# Patient Record
Sex: FEMALE | Race: BLACK OR AFRICAN AMERICAN | NOT HISPANIC OR LATINO | Employment: STUDENT | URBAN - METROPOLITAN AREA
[De-identification: names, ages, dates, MRNs, and addresses within clinical notes are randomized per-mention and may not be internally consistent; named-entity substitution may affect disease eponyms.]

---

## 2020-10-15 ENCOUNTER — OFFICE VISIT (OUTPATIENT)
Dept: URGENT CARE | Facility: CLINIC | Age: 20
End: 2020-10-15
Payer: COMMERCIAL

## 2020-10-15 VITALS
RESPIRATION RATE: 22 BRPM | SYSTOLIC BLOOD PRESSURE: 128 MMHG | OXYGEN SATURATION: 98 % | DIASTOLIC BLOOD PRESSURE: 76 MMHG | HEART RATE: 120 BPM | TEMPERATURE: 102 F | WEIGHT: 134 LBS

## 2020-10-15 DIAGNOSIS — G44.89 OTHER HEADACHE SYNDROME: ICD-10-CM

## 2020-10-15 DIAGNOSIS — R50.9 FEVER, UNSPECIFIED FEVER CAUSE: Primary | ICD-10-CM

## 2020-10-15 DIAGNOSIS — N89.8 VAGINAL DISCHARGE: ICD-10-CM

## 2020-10-15 DIAGNOSIS — N75.0 INFECTED CYST OF BARTHOLIN'S GLAND DUCT: ICD-10-CM

## 2020-10-15 LAB
B-HCG UR QL: NEGATIVE
BILIRUB UR QL STRIP: NEGATIVE
CTP QC/QA: YES
FLUAV AG NPH QL: NEGATIVE
FLUBV AG NPH QL: NEGATIVE
GLUCOSE UR QL STRIP: NEGATIVE
KETONES UR QL STRIP: NEGATIVE
LEUKOCYTE ESTERASE UR QL STRIP: POSITIVE
PH, POC UA: 7 (ref 5–8)
POC BLOOD, URINE: NEGATIVE
POC NITRATES, URINE: NEGATIVE
PROT UR QL STRIP: NEGATIVE
SARS-COV-2 RDRP RESP QL NAA+PROBE: NEGATIVE
SP GR UR STRIP: 1.01 (ref 1–1.03)
UROBILINOGEN UR STRIP-ACNC: ABNORMAL (ref 0.1–1.1)

## 2020-10-15 PROCEDURE — 96372 THER/PROPH/DIAG INJ SC/IM: CPT | Mod: S$GLB,,, | Performed by: NURSE PRACTITIONER

## 2020-10-15 PROCEDURE — 87075 CULTR BACTERIA EXCEPT BLOOD: CPT

## 2020-10-15 PROCEDURE — 81003 POCT URINALYSIS, DIPSTICK, AUTOMATED, W/O SCOPE: ICD-10-PCS | Mod: QW,S$GLB,, | Performed by: NURSE PRACTITIONER

## 2020-10-15 PROCEDURE — U0002: ICD-10-PCS | Mod: QW,S$GLB,, | Performed by: NURSE PRACTITIONER

## 2020-10-15 PROCEDURE — 96372 PR INJECTION,THERAP/PROPH/DIAG2ST, IM OR SUBCUT: ICD-10-PCS | Mod: S$GLB,,, | Performed by: NURSE PRACTITIONER

## 2020-10-15 PROCEDURE — 99204 OFFICE O/P NEW MOD 45 MIN: CPT | Mod: 25,S$GLB,, | Performed by: NURSE PRACTITIONER

## 2020-10-15 PROCEDURE — 87804 POCT INFLUENZA A/B: ICD-10-PCS | Mod: QW,S$GLB,, | Performed by: NURSE PRACTITIONER

## 2020-10-15 PROCEDURE — 81003 URINALYSIS AUTO W/O SCOPE: CPT | Mod: QW,S$GLB,, | Performed by: NURSE PRACTITIONER

## 2020-10-15 PROCEDURE — U0002 COVID-19 LAB TEST NON-CDC: HCPCS | Mod: QW,S$GLB,, | Performed by: NURSE PRACTITIONER

## 2020-10-15 PROCEDURE — 99204 PR OFFICE/OUTPT VISIT, NEW, LEVL IV, 45-59 MIN: ICD-10-PCS | Mod: 25,S$GLB,, | Performed by: NURSE PRACTITIONER

## 2020-10-15 PROCEDURE — 87804 INFLUENZA ASSAY W/OPTIC: CPT | Mod: QW,S$GLB,, | Performed by: NURSE PRACTITIONER

## 2020-10-15 PROCEDURE — 87070 CULTURE OTHR SPECIMN AEROBIC: CPT

## 2020-10-15 RX ORDER — CEFTRIAXONE 1 G/1
1 INJECTION, POWDER, FOR SOLUTION INTRAMUSCULAR; INTRAVENOUS
Status: COMPLETED | OUTPATIENT
Start: 2020-10-15 | End: 2020-10-15

## 2020-10-15 RX ORDER — SULFAMETHOXAZOLE AND TRIMETHOPRIM 800; 160 MG/1; MG/1
1 TABLET ORAL 2 TIMES DAILY
Qty: 14 TABLET | Refills: 0 | Status: SHIPPED | OUTPATIENT
Start: 2020-10-15 | End: 2020-10-22

## 2020-10-15 RX ORDER — IBUPROFEN 600 MG/1
600 TABLET ORAL EVERY 8 HOURS PRN
Qty: 30 TABLET | Refills: 0 | Status: SHIPPED | OUTPATIENT
Start: 2020-10-15 | End: 2021-10-15

## 2020-10-15 RX ORDER — IBUPROFEN 200 MG
600 TABLET ORAL
Status: COMPLETED | OUTPATIENT
Start: 2020-10-15 | End: 2020-10-15

## 2020-10-15 RX ADMIN — Medication 600 MG: at 03:10

## 2020-10-15 RX ADMIN — CEFTRIAXONE 1 G: 1 INJECTION, POWDER, FOR SOLUTION INTRAMUSCULAR; INTRAVENOUS at 04:10

## 2020-10-15 NOTE — PROGRESS NOTES
Subjective:       Patient ID: Vaishnavi Finn is a 19 y.o. female.    Vitals:  weight is 60.8 kg (134 lb). Her temperature is 102.2 °F (39 °C) (abnormal). Her blood pressure is 128/76 and her pulse is 120 (abnormal). Her respiration is 22 (abnormal) and oxygen saturation is 98%.     Chief Complaint: Vaginal Discharge (yellow 2 days now ), Vaginal Pain ((bumps), Fever (102.2 (body aches)), and Headache (dizzy)    Vaginal Discharge  The patient's primary symptoms include vaginal discharge. The patient's pertinent negatives include no genital itching, genital odor, missed menses, pelvic pain or vaginal bleeding. This is a new problem. The current episode started yesterday. The problem occurs constantly. The problem has been gradually worsening. The pain is mild. The problem affects the right side. She is not pregnant. Associated symptoms include a fever. Pertinent negatives include no abdominal pain, back pain, chills, dysuria, flank pain, frequency, hematuria, nausea, rash, urgency or vomiting. The vaginal discharge was yellow. There has been no bleeding. She has not been passing clots. She has not been passing tissue. Nothing aggravates the symptoms. She has tried nothing for the symptoms. She is not sexually active (has never had sexual intercourse). She uses nothing for contraception. There is no history of ovarian cysts.   Vaginal Pain  The patient's primary symptoms include vaginal discharge. The patient's pertinent negatives include no genital itching, genital odor, missed menses, pelvic pain or vaginal bleeding. The pain is severe. Associated symptoms include a fever. Pertinent negatives include no abdominal pain, back pain, chills, dysuria, flank pain, frequency, hematuria, nausea, rash, urgency or vomiting. There is no history of ovarian cysts.   Fever   Pertinent negatives include no abdominal pain, nausea, rash, urinary pain or vomiting.       Constitution: Positive for fever. Negative for chills.   Neck:  Negative for painful lymph nodes.   Gastrointestinal: Negative for abdominal pain, nausea and vomiting.   Genitourinary: Positive for vaginal pain (bumps) and vaginal discharge. Negative for dysuria, frequency, urgency, urine decreased, flank pain, hematuria, history of kidney stones, painful menstruation, irregular menstruation, missed menses, heavy menstrual bleeding, ovarian cysts, genital trauma, vaginal bleeding, vaginal odor, painful intercourse, genital sore, painful ejaculation and pelvic pain.   Musculoskeletal: Negative for back pain.   Skin: Negative for rash and lesion.   Hematologic/Lymphatic: Negative for swollen lymph nodes.       Objective:      Physical Exam   Constitutional: She is oriented to person, place, and time. She appears well-developed.  Non-toxic appearance. She appears ill. No distress.   HENT:   Head: Normocephalic and atraumatic.   Ears:   Right Ear: External ear normal.   Left Ear: External ear normal.   Nose: Nose normal. No nasal deformity. No epistaxis.   Mouth/Throat: Oropharynx is clear and moist and mucous membranes are normal.   Eyes: Lids are normal.   Neck: Trachea normal, normal range of motion and phonation normal. Neck supple.   Cardiovascular: Normal pulses.   Pulmonary/Chest: Effort normal.   Abdominal: Soft. Normal appearance. She exhibits no distension. There is no abdominal tenderness.   Genitourinary:          Pelvic exam was performed with patient supine.   There is lesion on the left labia.   Neurological: She is alert and oriented to person, place, and time.   Skin: Skin is warm, dry, intact and not diaphoretic. Psychiatric: Her speech is normal and behavior is normal.   Nursing note and vitals reviewed.        Assessment:       1. Fever, unspecified fever cause    2. Vaginal discharge    3. Other headache syndrome    4. Infected cyst of Bartholin's gland duct        Plan:         Fever, unspecified fever cause  -     POCT Urinalysis, Dipstick, Automated, W/O  Scope  -     POCT urine pregnancy  -     POCT COVID-19 Rapid Screening  -     ibuprofen tablet 600 mg  -     POCT Influenza A/B  -     Culture, Aerobic  -     Culture, Anaerobic  -     sulfamethoxazole-trimethoprim 800-160mg (BACTRIM DS) 800-160 mg Tab; Take 1 tablet by mouth 2 (two) times daily. for 7 days  Dispense: 14 tablet; Refill: 0  -     cefTRIAXone injection 1 g  -     Ambulatory referral/consult to Obstetrics / Gynecology  -     ibuprofen (ADVIL,MOTRIN) 600 MG tablet; Take 1 tablet (600 mg total) by mouth every 8 (eight) hours as needed.  Dispense: 30 tablet; Refill: 0    Vaginal discharge  -     Culture, Aerobic  -     Culture, Anaerobic  -     sulfamethoxazole-trimethoprim 800-160mg (BACTRIM DS) 800-160 mg Tab; Take 1 tablet by mouth 2 (two) times daily. for 7 days  Dispense: 14 tablet; Refill: 0    Other headache syndrome  -     POCT Influenza A/B    Infected cyst of Bartholin's gland duct  -     sulfamethoxazole-trimethoprim 800-160mg (BACTRIM DS) 800-160 mg Tab; Take 1 tablet by mouth 2 (two) times daily. for 7 days  Dispense: 14 tablet; Refill: 0  -     cefTRIAXone injection 1 g  -     Ambulatory referral/consult to Obstetrics / Gynecology  -     ibuprofen (ADVIL,MOTRIN) 600 MG tablet; Take 1 tablet (600 mg total) by mouth every 8 (eight) hours as needed.  Dispense: 30 tablet; Refill: 0

## 2020-10-15 NOTE — PATIENT INSTRUCTIONS
Return to Urgent Care or go to ER if symptoms worsen or fail to improve.  Follow up with PCP as recommended for further management.   Return to Urgent Care tomorrow for evaluation of response to treatment.    You have been referred to GYN for further management.  Scheduling should contact you  to make an appointment.  If you do not hear from anyone, please call [phone] to schedule an Urgent appointment with GYN.      Understanding Bartholin Cyst and Abscess    A woman has two Bartholin glands. They are located on the sides of the vaginal opening. These pea-sized glands make mucus. This mucus lubricates the outside part of the vagina (vulva). If a tube (duct) in one of these glands becomes blocked, it can cause a cyst or abscess.  What causes a Bartholin cyst or abscess?  A cyst can form when the duct of a Bartholin gland becomes blocked. The mucus cant come out of the gland. It builds up. If the cyst becomes infected, it may turn into an abscess.  A blockage in the gland can occur from an injury or an infection. It may also be linked to a sexually transmitted disease.  Symptoms of a Bartholin cyst or abscess  A Bartholin cyst starts as a small bump. It may cause no other symptoms. Or it may grow bigger. It can then cause swelling and pain. If an abscess forms, it can be very painful. You may have trouble walking, sitting, or having sex.  Treatment for a Bartholin cyst or abscess  A cyst that doesnt cause any symptoms may not need to be treated. It may go away on its own. But if you feel discomfort or pain, treatment options include:  · Medicine. Over-the-counter pain medicines can help. In some cases, you may need antibiotics if an infection is severe or a cyst or abscess comes back.  · Sitz bath. Soaking the genital area in warm water can ease pain.  · Drainage. Cutting open the cyst allows the fluid inside it to drain. This eases discomfort and pain. The doctor may insert a tube (catheter) to help with  drainage. This catheter may need to stay in place for up to 3 weeks.  · Surgery. You may need to have the Bartholin glands removed if other treatments dont work.  When to call your healthcare provider  Call your healthcare provider right away if you have any of these:  · Fever of 100.4°F (38°C) or higher, or as directed  · Redness, swelling, or fluid leaking from the cyst that gets worse  · Pain that gets worse  · Symptoms that dont get better, or get worse  · New symptoms   Date Last Reviewed: 6/1/2016 © 2000-2017 [employer]. [address] [place] [address]. All rights reserved. This information is not intended as a substitute for professional medical care. Always follow your healthcare professional's instructions.        Bartholins Cyst: Common Treatments    The Bartholins glands are a pair of very small glands found inside the labia (vaginal lips). (There is one on either side.) The glands produce fluid to help keep the vagina moist. When the opening of a Bartholins gland becomes blocked, the gland may swell and form a cyst. The cyst may vary in size from small to large (1 to 3 cm in size). It may feel like a firm lump within the labia.  Treatment depends on various factors. These include the size of the cyst, whether it causes symptoms, and whether its infected. Small and/or uninfected cysts dont usually need treatment. Large cysts and those that are painful or infected will likely need treatment. Below are some common treatments that may be done:  · Incision and drainage: With this procedure, the area over the cyst is numbed. The cyst is cut and drained. Often, a thin tube with a balloon on the end (called a Word catheter) is then inserted into the empty cyst. This allows for further drainage of fluid. The catheter is left in place for 4 to 6 weeks. It is then removed by the healthcare provider.  · Marsupialization: With this procedure, the area over the cyst is numbed. The cyst  is cut and drained. The edges of the skin are then stitched to create a small opening that will allow for further drainage of fluid.  Note: If you have recurrent cysts, other treatments may be needed. Your provider can tell you more about these options.  If your cyst is infected, antibiotics will likely be prescribed. Most infections of Bartholins cysts are due to bacteria that are normally present in the vagina. Sometimes, the bacteria may have been passed to you during sex. This is called a sexually transmitted disease (STD). A test (culture) of the fluid can confirm if you have an STD.  Home care  Medicines  · If antibiotics are prescribed, be sure to take them exactly as directed. Dont stop taking the medicine, even if you start to feel better. Note: If the cause of your infection is an STD, any sexual partners you have will also need to be tested and treated.  · If you have pain, use over-the-counter pain medicine as directed. If needed, stronger pain medicines can be prescribed.   General care  · To help relieve discomfort, you may be advised to take sitz baths for the next few days. For this, you soak in bath filled with 2 to 3 inches of warm water for 10 to 15 minutes, a few times a day.  · Avoid having sex until the cyst has healed. If the cause of your infection is an STD, also avoid having sex until you and any partners you have are done with treatment.  Follow-up care  Follow up with your healthcare provider as directed. Be sure to keep all appointments. These are needed to ensure that you are recovering well after your treatment. If you have a catheter, your provider will let you know when to return to have it removed.  When to seek medical advice  Call your healthcare provider right away if any of these occur:  · Fever does not go down or worsens  · Increased redness, pain, swelling, or foul-smelling drainage from the cyst or the area around it  · Pain in the lower abdomen   · New rash or joint  pain  · Catheter falls out before the scheduled time to remove it (only if a Word catheter was placed)  Date Last Reviewed: 6/11/2015  © 8455-7414 [employer]. [address] [place] [address], [place] [address]. All rights reserved. This information is not intended as a substitute for professional medical care. Always follow your healthcare professional's instructions.

## 2020-10-16 ENCOUNTER — CLINICAL SUPPORT (OUTPATIENT)
Dept: URGENT CARE | Facility: CLINIC | Age: 20
End: 2020-10-16
Payer: COMMERCIAL

## 2020-10-16 VITALS
HEIGHT: 70 IN | HEART RATE: 81 BPM | DIASTOLIC BLOOD PRESSURE: 68 MMHG | RESPIRATION RATE: 18 BRPM | OXYGEN SATURATION: 98 % | TEMPERATURE: 97 F | BODY MASS INDEX: 19.18 KG/M2 | SYSTOLIC BLOOD PRESSURE: 111 MMHG | WEIGHT: 134 LBS

## 2020-10-16 DIAGNOSIS — N90.89 LABIAL LESION: Primary | ICD-10-CM

## 2020-10-16 PROCEDURE — 99214 PR OFFICE/OUTPT VISIT, EST, LEVL IV, 30-39 MIN: ICD-10-PCS | Mod: S$GLB,,, | Performed by: FAMILY MEDICINE

## 2020-10-16 PROCEDURE — 99214 OFFICE O/P EST MOD 30 MIN: CPT | Mod: S$GLB,,, | Performed by: FAMILY MEDICINE

## 2020-10-16 NOTE — PATIENT INSTRUCTIONS
Discharge Instructions for Cellulitis  You have been diagnosed with cellulitis. This is an infection in the deepest layer of the skin. In some cases, the infection also affects the muscle. Cellulitis is caused by bacteria. The bacteria can enter the body through broken skin. This can happen with a cut, scratch, animal bite, or an insect bite that has been scratched. You may have been treated in the hospital with antibiotics and fluids. You will likely be given a prescription for antibiotics to take at home. This sheet will help you take care of yourself at home.  Home care  When you are home:  · Take the prescribed antibiotic medicine you are given as directed until it is gone. Take it even if you feel better. It treats the infection and stops it from returning. Not taking all the medicine can make future infections hard to treat.  · Keep the infected area clean.  · When possible, raise the infected area above the level of your heart. This helps keep swelling down.  · Talk with your healthcare provider if you are in pain. Ask what kind of over-the-counter medicine you can take for pain.  · Apply clean bandages as advised.  · Take your temperature once a day for a week.  · Wash your hands often to prevent spreading the infection.  In the future, wash your hands before and after you touch cuts, scratches, or bandages. This will help prevent infection.   When to call your healthcare provider  Call your healthcare provider immediately if you have any of the following:  · Difficulty or pain when moving the joints above or below the infected area  · Discharge or pus draining from the area  · Fever of 100.4°F (38°C) or higher, or as directed by your healthcare provider  · Pain that gets worse in or around the infected   · Redness that gets worse in or around the infected area, particularly if the area of redness expands to a wider area  · Shaking chills  · Swelling of the infected area  · Vomiting   Date Last Reviewed:  8/1/2016 © 2000-2017 [employer]. [address] [place] [address], [place] [address]. All rights reserved. This information is not intended as a substitute for professional medical care. Always follow your healthcare professional's instructions.        Cellulitis  Cellulitis is an infection of the deep layers of skin. A break in the skin, such as a cut or scratch, can let bacteria under the skin. If the bacteria get to deep layers of the skin, it can be serious. If not treated, cellulitis can get into the bloodstream and lymph nodes. The infection can then spread throughout the body. This causes serious illness.  Cellulitis causes the affected skin to become red, swollen, warm, and sore. The reddened areas have a visible border. An open sore may leak fluid (pus). You may have a fever, chills, and pain.  Cellulitis is treated with antibiotics taken for 7 to 10 days. An open sore may be cleaned and covered with cool wet gauze. Symptoms should get better 1 to 2 days after treatment is started. Make sure to take all the antibiotics for the full number of days until they are gone. Keep taking the medicine even if your symptoms go away.  Home care  Follow these tips:  · Limit the use of the part of your body with cellulitis.   · If the infection is on your leg, keep your leg raised while sitting. This will help to reduce swelling.  · Take all of the antibiotic medicine exactly as directed until it is gone. Do not miss any doses, especially during the first 7 days. Dont stop taking the medicine when your symptoms get better.  · Keep the affected area clean and dry.  · Wash your hands with soap and warm water before and after touching your skin. Anyone else who touches your skin should also wash his or her hands. Don't share towels.  Follow-up care  Follow up with your healthcare provider, or as advised. If your infection does not go away on the first antibiotic, your healthcare provider will prescribe a different  one.  When to seek medical advice  Call your healthcare provider right away if any of these occur:  · Red areas that spread  · Swelling or pain that gets worse  · Fluid leaking from the skin (pus)  · Fever higher of 100.4º F (38.0º C) or higher after 2 days on antibiotics  Date Last Reviewed: 9/1/2016 © 2000-2017 [employer]. [address] [place] [address], [place] [address]. All rights reserved. This information is not intended as a substitute for professional medical care. Always follow your healthcare professional's instructions.        Genital Herpes    Genital herpes is a common sexually transmitted disease (STD). It is caused by the herpes simplex virus (HSV). One out of five teens and adults carry the herpes virus. During an outbreak, it causes small blisters that break open, leaving small, painful sores in the genital area. Eventually, scabs form and the sores heal. In women, these show up most often on the skin just outside the vaginal opening. They can occur on the buttocks, anus, or cervix. In men, the sores are usually on the tip, sides, or base of the penis. They also occur on the scrotum, buttocks, or thighs.  The first outbreak begins within 2 to 3 weeks after exposure to an infected sexual partner. It may last 1 to 3 weeks. It may cause headache, muscle ache, and fevers. The first outbreak is usually the worst. Because the virus remains in the body even after the sores heal, most people will have more outbreaks. The frequency of outbreaks is different for each person. Some people will never have another outbreak. Others will have several episodes a year. Later outbreaks are usually shorter, milder, and less painful. For many, the number of outbreaks tends to decrease over time. Various factors may trigger an outbreak. These include:  · Emotional stress  · Menstruation  · Presence of another illness (cold, flu, or fever from any cause)  · Overexertion and fatigue  · Weakened immune system  Home  care  · It is very important that you do not have sexual relations until all the herpes sores have healed completely.  · Wash the affected area gently with mild soap and water. Wash your hands after touching the affected area.  · You may use over-the-counter pain medicine unless another pain medicine was prescribed. (Note: If you have chronic liver or kidney disease or have ever had a stomach ulcer or gastrointestinal bleeding, talk with your healthcare provider before using these medicines. Also talk to your provider if you are taking medicine to prevent blood clots.) Aspirin should never be given to anyone younger than 18 years of age who is ill with a viral infection or fever. It may cause severe liver or brain damage.  · Your healthcare provider may prescribe antiviral medicine during the first outbreak. This will help the sores heal faster. Antiviral medicine may also be prescribed so that you have it ready to take at the first sign of another outbreak. This will help the symptoms go away sooner. For people with frequent outbreaks, daily therapy may be prescribed. This will help reduce the frequency of attacks. Daily therapy may also reduce risk of spread of herpes to your sexual partner. Discuss the risks and benefits of daily therapy with your healthcare provider.  · If you are a woman who is pregnant now or may become pregnant in the future, let your healthcare provider know that you have had herpes. This may affect the way your baby is delivered.  Preventing spread to others  The virus is spread by sexual contact with someone who has the herpes virus. The risk of spread is highest when the sores are present. However, there is a chance of spreading the virus even when sores are not visible. Inform future sexual partners that you have herpes and that they may become infected. To reduce the risk of passing the virus to a partner who has never had herpes, avoid sexual relations at the first sign of an outbreak  and until the sores are fully healed. Latex barriers, such as condoms, reduce the risk of spread between outbreaks if the infected site is covered, but they do not guarantee protection.  Follow-up care  Follow up with your healthcare provider, or as advised.  People who have just learned that they have herpes may feel upset. Getting the facts about herpes can help you feel more in control. Follow up with your healthcare provider or the public health department for complete STD screening, including HIV testing. For more information about herpes, visit the Centers for Disease Control (CDC) Genital Herpes website at: www.cdc.gov/std/Herpes/default.htm.  When to seek medical advice  Call your healthcare provider right away if any of these occur:  · Inability to urinate due to pain  · Swelling or increasing redness in the genital area  · Unusual drowsiness, weakness, or confusion  · Headache or stiff neck  · Discharge from the vagina or penis  · Increasing back or abdominal pain  · Rash or joint pain  Date Last Reviewed: 9/25/2015  © 9775-5928 [employer]. [address] [place] [address], [place] [address]. All rights reserved. This information is not intended as a substitute for professional medical care. Always follow your healthcare professional's instructions.

## 2020-10-16 NOTE — PROGRESS NOTES
"Subjective:       Patient ID: Vaishnavi Finn is a 19 y.o. female.    Vitals:  height is 5' 10" (1.778 m) and weight is 60.8 kg (134 lb). Her temperature is 97.4 °F (36.3 °C). Her blood pressure is 111/68 and her pulse is 81. Her respiration is 18 and oxygen saturation is 98%.     Chief Complaint: F/U genital bump    Pt presents for a F/U visit regarding her vaginal problems which she was last evaluated for yesterday at [facility]. Pt states provider told her to come back today for a follow up visit. No fever. Pt states there was a bump on her genitals and was prescribed an antibiotic.    Female  Problem  The patient's pertinent negatives include no missed menses, pelvic pain or vaginal discharge. Primary symptoms comment: genital bump. This is a recurrent problem. Episode onset: 3 days. The problem has been unchanged. The problem affects the left side. Associated symptoms include headaches and nausea. Pertinent negatives include no abdominal pain, back pain, chills, constipation, diarrhea, discolored urine, dysuria, fever, flank pain, frequency, hematuria, rash, urgency or vomiting. She has tried antibiotics for the symptoms. The treatment provided no relief. She is not sexually active. She uses nothing for contraception. Her menstrual history has been regular. There is no history of ovarian cysts.       Constitution: Negative for chills and fever.   Neck: Negative for painful lymph nodes.   Gastrointestinal: Positive for nausea. Negative for abdominal pain, vomiting, constipation and diarrhea.   Genitourinary: Negative for dysuria, frequency, urgency, urine decreased, flank pain, hematuria, history of kidney stones, painful menstruation, irregular menstruation, missed menses, heavy menstrual bleeding, ovarian cysts, genital trauma, vaginal pain, vaginal discharge, vaginal bleeding, vaginal odor, painful intercourse, genital sore, painful ejaculation and pelvic pain.   Musculoskeletal: Negative for back pain. "   Skin: Negative for rash and lesion.   Neurological: Positive for headaches.   Hematologic/Lymphatic: Negative for swollen lymph nodes.       Objective:      Physical Exam   Constitutional: She is oriented to person, place, and time.   Abdominal: Normal appearance.   Genitourinary:         Comments: Multiple white based labial ulcers on both sides of vulva, on the left is a pustule with surrounding erythema and induration     Neurological: She is alert and oriented to person, place, and time.   Skin: Skin is warm and dry. Psychiatric: Mood, judgment and thought content normal.   Nursing note and vitals reviewed.        Assessment:       1. Labial lesion        Plan:         Labial lesion  -     HSV 1 & 2, IgG; Future; Expected date: 11/16/2020    continue on antibiotics prescribed yesterday for what I suspect is a secondary infection. Call if not improving in the next few days. Also recommend HSV testing in a few weeks      Patient Instructions       Discharge Instructions for Cellulitis  You have been diagnosed with cellulitis. This is an infection in the deepest layer of the skin. In some cases, the infection also affects the muscle. Cellulitis is caused by bacteria. The bacteria can enter the body through broken skin. This can happen with a cut, scratch, animal bite, or an insect bite that has been scratched. You may have been treated in the hospital with antibiotics and fluids. You will likely be given a prescription for antibiotics to take at home. This sheet will help you take care of yourself at home.  Home care  When you are home:  · Take the prescribed antibiotic medicine you are given as directed until it is gone. Take it even if you feel better. It treats the infection and stops it from returning. Not taking all the medicine can make future infections hard to treat.  · Keep the infected area clean.  · When possible, raise the infected area above the level of your heart. This helps keep swelling  down.  · Talk with your healthcare provider if you are in pain. Ask what kind of over-the-counter medicine you can take for pain.  · Apply clean bandages as advised.  · Take your temperature once a day for a week.  · Wash your hands often to prevent spreading the infection.  In the future, wash your hands before and after you touch cuts, scratches, or bandages. This will help prevent infection.   When to call your healthcare provider  Call your healthcare provider immediately if you have any of the following:  · Difficulty or pain when moving the joints above or below the infected area  · Discharge or pus draining from the area  · Fever of 100.4°F (38°C) or higher, or as directed by your healthcare provider  · Pain that gets worse in or around the infected   · Redness that gets worse in or around the infected area, particularly if the area of redness expands to a wider area  · Shaking chills  · Swelling of the infected area  · Vomiting   Date Last Reviewed: 8/1/2016  © 7618-4929 [employer]. [address] [place] [address]. All rights reserved. This information is not intended as a substitute for professional medical care. Always follow your healthcare professional's instructions.        Cellulitis  Cellulitis is an infection of the deep layers of skin. A break in the skin, such as a cut or scratch, can let bacteria under the skin. If the bacteria get to deep layers of the skin, it can be serious. If not treated, cellulitis can get into the bloodstream and lymph nodes. The infection can then spread throughout the body. This causes serious illness.  Cellulitis causes the affected skin to become red, swollen, warm, and sore. The reddened areas have a visible border. An open sore may leak fluid (pus). You may have a fever, chills, and pain.  Cellulitis is treated with antibiotics taken for 7 to 10 days. An open sore may be cleaned and covered with cool wet gauze. Symptoms should get better 1 to  2 days after treatment is started. Make sure to take all the antibiotics for the full number of days until they are gone. Keep taking the medicine even if your symptoms go away.  Home care  Follow these tips:  · Limit the use of the part of your body with cellulitis.   · If the infection is on your leg, keep your leg raised while sitting. This will help to reduce swelling.  · Take all of the antibiotic medicine exactly as directed until it is gone. Do not miss any doses, especially during the first 7 days. Dont stop taking the medicine when your symptoms get better.  · Keep the affected area clean and dry.  · Wash your hands with soap and warm water before and after touching your skin. Anyone else who touches your skin should also wash his or her hands. Don't share towels.  Follow-up care  Follow up with your healthcare provider, or as advised. If your infection does not go away on the first antibiotic, your healthcare provider will prescribe a different one.  When to seek medical advice  Call your healthcare provider right away if any of these occur:  · Red areas that spread  · Swelling or pain that gets worse  · Fluid leaking from the skin (pus)  · Fever higher of 100.4º F (38.0º C) or higher after 2 days on antibiotics  Date Last Reviewed: 9/1/2016  © 2984-4634 [employer]. [address] [place] [address], [place] [address]. All rights reserved. This information is not intended as a substitute for professional medical care. Always follow your healthcare professional's instructions.        Genital Herpes    Genital herpes is a common sexually transmitted disease (STD). It is caused by the herpes simplex virus (HSV). One out of five teens and adults carry the herpes virus. During an outbreak, it causes small blisters that break open, leaving small, painful sores in the genital area. Eventually, scabs form and the sores heal. In women, these show up most often on the skin just outside the vaginal opening. They  can occur on the buttocks, anus, or cervix. In men, the sores are usually on the tip, sides, or base of the penis. They also occur on the scrotum, buttocks, or thighs.  The first outbreak begins within 2 to 3 weeks after exposure to an infected sexual partner. It may last 1 to 3 weeks. It may cause headache, muscle ache, and fevers. The first outbreak is usually the worst. Because the virus remains in the body even after the sores heal, most people will have more outbreaks. The frequency of outbreaks is different for each person. Some people will never have another outbreak. Others will have several episodes a year. Later outbreaks are usually shorter, milder, and less painful. For many, the number of outbreaks tends to decrease over time. Various factors may trigger an outbreak. These include:  · Emotional stress  · Menstruation  · Presence of another illness (cold, flu, or fever from any cause)  · Overexertion and fatigue  · Weakened immune system  Home care  · It is very important that you do not have sexual relations until all the herpes sores have healed completely.  · Wash the affected area gently with mild soap and water. Wash your hands after touching the affected area.  · You may use over-the-counter pain medicine unless another pain medicine was prescribed. (Note: If you have chronic liver or kidney disease or have ever had a stomach ulcer or gastrointestinal bleeding, talk with your healthcare provider before using these medicines. Also talk to your provider if you are taking medicine to prevent blood clots.) Aspirin should never be given to anyone younger than 18 years of age who is ill with a viral infection or fever. It may cause severe liver or brain damage.  · Your healthcare provider may prescribe antiviral medicine during the first outbreak. This will help the sores heal faster. Antiviral medicine may also be prescribed so that you have it ready to take at the first sign of another outbreak. This  will help the symptoms go away sooner. For people with frequent outbreaks, daily therapy may be prescribed. This will help reduce the frequency of attacks. Daily therapy may also reduce risk of spread of herpes to your sexual partner. Discuss the risks and benefits of daily therapy with your healthcare provider.  · If you are a woman who is pregnant now or may become pregnant in the future, let your healthcare provider know that you have had herpes. This may affect the way your baby is delivered.  Preventing spread to others  The virus is spread by sexual contact with someone who has the herpes virus. The risk of spread is highest when the sores are present. However, there is a chance of spreading the virus even when sores are not visible. Inform future sexual partners that you have herpes and that they may become infected. To reduce the risk of passing the virus to a partner who has never had herpes, avoid sexual relations at the first sign of an outbreak and until the sores are fully healed. Latex barriers, such as condoms, reduce the risk of spread between outbreaks if the infected site is covered, but they do not guarantee protection.  Follow-up care  Follow up with your healthcare provider, or as advised.  People who have just learned that they have herpes may feel upset. Getting the facts about herpes can help you feel more in control. Follow up with your healthcare provider or the public health department for complete STD screening, including HIV testing. For more information about herpes, visit the Centers for Disease Control (CDC) Genital Herpes website at: www.cdc.gov/std/Herpes/default.htm.  When to seek medical advice  Call your healthcare provider right away if any of these occur:  · Inability to urinate due to pain  · Swelling or increasing redness in the genital area  · Unusual drowsiness, weakness, or confusion  · Headache or stiff neck  · Discharge from the vagina or penis  · Increasing back or  abdominal pain  · Rash or joint pain  Date Last Reviewed: 9/25/2015  © 4371-7085 [employer]. [address] [place] [address], [place] [address]. All rights reserved. This information is not intended as a substitute for professional medical care. Always follow your healthcare professional's instructions.

## 2020-10-19 ENCOUNTER — TELEPHONE (OUTPATIENT)
Dept: OBSTETRICS AND GYNECOLOGY | Facility: CLINIC | Age: 20
End: 2020-10-19

## 2020-10-19 LAB — BACTERIA SPEC AEROBE CULT: NORMAL

## 2020-10-19 NOTE — TELEPHONE ENCOUNTER
----- Message from Greer Vallecillo sent at 10/19/2020 10:00 AM CDT -----  Regarding: GYN referral  Contact: preservice   The referral to OB-GYN is denied because the patient has [place] Medicaid. Any services rendered out of state will not be covered. I spoke with Sharon customer service with [facility] [phone]. Call reference# [id number]. Voicemail left for the patient at [phone] to advise of denial.

## 2020-10-19 NOTE — TELEPHONE ENCOUNTER
Noted message was left by department. Also I informed the  and she will try to contact again as she left a message trying to confirm the patient for tomorrow.

## 2020-10-21 LAB — BACTERIA SPEC ANAEROBE CULT: NORMAL

## 2020-10-23 ENCOUNTER — TELEPHONE (OUTPATIENT)
Dept: URGENT CARE | Facility: CLINIC | Age: 20
End: 2020-10-23

## 2020-10-24 NOTE — TELEPHONE ENCOUNTER
10/23 spoke with patient about lab results, patient still having discharge. Told patient she should follow up with Obgyn if sx continue

## 2020-10-24 NOTE — TELEPHONE ENCOUNTER
----- Message from Solange Lara MD sent at 10/22/2020  9:12 AM CDT -----  Please notify the culture showed no growth.  Recheck with any problems or follow-up with OBGYN.

## 2021-08-13 ENCOUNTER — OFFICE VISIT (OUTPATIENT)
Dept: URGENT CARE | Facility: CLINIC | Age: 21
End: 2021-08-13
Payer: COMMERCIAL

## 2021-08-13 VITALS
OXYGEN SATURATION: 97 % | DIASTOLIC BLOOD PRESSURE: 62 MMHG | HEIGHT: 70 IN | SYSTOLIC BLOOD PRESSURE: 126 MMHG | WEIGHT: 134 LBS | HEART RATE: 76 BPM | RESPIRATION RATE: 18 BRPM | TEMPERATURE: 98 F | BODY MASS INDEX: 19.18 KG/M2

## 2021-08-13 DIAGNOSIS — R30.0 DYSURIA: Primary | ICD-10-CM

## 2021-08-13 LAB
BILIRUB UR QL STRIP: NEGATIVE
GLUCOSE UR QL STRIP: NEGATIVE
KETONES UR QL STRIP: NEGATIVE
LEUKOCYTE ESTERASE UR QL STRIP: NEGATIVE
PH, POC UA: 8 (ref 5–8)
POC BLOOD, URINE: NEGATIVE
POC NITRATES, URINE: NEGATIVE
PROT UR QL STRIP: NEGATIVE
SP GR UR STRIP: 1 (ref 1–1.03)
UROBILINOGEN UR STRIP-ACNC: NORMAL (ref 0.1–1.1)

## 2021-08-13 PROCEDURE — 81003 POCT URINALYSIS, DIPSTICK, AUTOMATED, W/O SCOPE: ICD-10-PCS | Mod: QW,S$GLB,, | Performed by: STUDENT IN AN ORGANIZED HEALTH CARE EDUCATION/TRAINING PROGRAM

## 2021-08-13 PROCEDURE — 99213 PR OFFICE/OUTPT VISIT, EST, LEVL III, 20-29 MIN: ICD-10-PCS | Mod: 25,S$GLB,, | Performed by: STUDENT IN AN ORGANIZED HEALTH CARE EDUCATION/TRAINING PROGRAM

## 2021-08-13 PROCEDURE — 81003 URINALYSIS AUTO W/O SCOPE: CPT | Mod: QW,S$GLB,, | Performed by: STUDENT IN AN ORGANIZED HEALTH CARE EDUCATION/TRAINING PROGRAM

## 2021-08-13 PROCEDURE — 99213 OFFICE O/P EST LOW 20 MIN: CPT | Mod: 25,S$GLB,, | Performed by: STUDENT IN AN ORGANIZED HEALTH CARE EDUCATION/TRAINING PROGRAM

## 2021-08-13 RX ORDER — ESCITALOPRAM OXALATE 5 MG/1
5 TABLET ORAL DAILY
COMMUNITY
End: 2022-07-22

## 2022-04-07 ENCOUNTER — OFFICE VISIT (OUTPATIENT)
Dept: URGENT CARE | Facility: CLINIC | Age: 22
End: 2022-04-07
Payer: COMMERCIAL

## 2022-04-07 VITALS
SYSTOLIC BLOOD PRESSURE: 116 MMHG | TEMPERATURE: 98 F | DIASTOLIC BLOOD PRESSURE: 60 MMHG | HEIGHT: 70 IN | HEART RATE: 75 BPM | WEIGHT: 135 LBS | OXYGEN SATURATION: 98 % | RESPIRATION RATE: 16 BRPM | BODY MASS INDEX: 19.33 KG/M2

## 2022-04-07 DIAGNOSIS — S29.012A STRAIN OF THORACIC BACK REGION: Primary | ICD-10-CM

## 2022-04-07 DIAGNOSIS — R20.2 PARESTHESIA OF LEFT ARM: ICD-10-CM

## 2022-04-07 PROCEDURE — 99213 OFFICE O/P EST LOW 20 MIN: CPT | Mod: S$GLB,,, | Performed by: PHYSICIAN ASSISTANT

## 2022-04-07 PROCEDURE — 99213 PR OFFICE/OUTPT VISIT, EST, LEVL III, 20-29 MIN: ICD-10-PCS | Mod: S$GLB,,, | Performed by: PHYSICIAN ASSISTANT

## 2022-04-07 RX ORDER — CYCLOBENZAPRINE HCL 5 MG
5 TABLET ORAL 3 TIMES DAILY PRN
Qty: 21 TABLET | Refills: 0 | Status: SHIPPED | OUTPATIENT
Start: 2022-04-07 | End: 2022-04-14

## 2022-04-07 RX ORDER — DICLOFENAC SODIUM 50 MG/1
50 TABLET, DELAYED RELEASE ORAL 3 TIMES DAILY PRN
Qty: 21 TABLET | Refills: 0 | Status: SHIPPED | OUTPATIENT
Start: 2022-04-07 | End: 2022-04-14

## 2022-04-07 NOTE — PATIENT INSTRUCTIONS
- Rest.    - Drink plenty of fluids.    - Acetaminophen (tylenol) or Ibuprofen (advil,motrin) as directed as needed for fever/pain. Avoid tylenol if you have a history of liver disease. Do not take ibuprofen if you have a history of GI bleeding, kidney disease, or if you take blood thinners.     - take diclofenac as prescribed with food to help with pain and inflammation.  Do not take over the counter NSAIDs (aleve, advil, ibuprofen) while taking this medication.  You can take Tylenol 650-1000 mg every 6-8 hours as directed along with this medication for pain relief.    -you can take flexeril (cyclobenzaprine)  as prescribed.  Initial dose is 5 mg, may increase to 10 mg if needed. This is a muscle relaxer that can help with tension of the muscles to help reduce pain. This can also help with muscle spasm. This medicine may cause drowsiness.  Do not drive for operate heavy machinery while on this medication.     - No heavy lifting.    - Icing a muscle strain is best for the first 24-48 hours after an injury. After that, low heat to areas of pain for 20 minutes at a time can help.  - Go to the ER for any weakness or sensation loss of the legs, or loss of bowel or bladder control.      - Follow up with your PCP or ortho spine specialist as directed in the next 1-2 weeks if not improved or as needed.  You can call [phone]  to schedule an appointment with the appropriate provider.    - Go to the ER or seek medical attention immediately if you develop new or worsening symptoms.     - You must understand that you have received an Urgent Care treatment only and that you may be released before all of your medical problems are known or treated.   - You, the patient, will arrange for follow up care as instructed.   - If your condition worsens or fails to improve we recommend that you receive another evaluation at the ER immediately or contact your PCP to discuss your concerns or return here.

## 2022-04-07 NOTE — PROGRESS NOTES
"Subjective:       Patient ID: Vaishnavi Finn is a 21 y.o. female.    Vitals:  height is 5' 10" (1.778 m) and weight is 61.2 kg (135 lb). Her temperature is 98.4 °F (36.9 °C). Her blood pressure is 116/60 and her pulse is 75. Her respiration is 16 and oxygen saturation is 98%.     Chief Complaint: Back Pain    Pt presents with complaint of back pain that began 3 days ago. Pt states she was sitting in bed, and when she went to get up she immediately felt the back pain- located on left side mid to upper back medial to scapula.  No prior injury or trauma. At times, pain will radiate up towards neck. Pain is brought on with any movements of trunk, arm, scapula, deep breaths, sneezing/cough. Denies fever, URI symptoms, or frequent cough. No urinary symptoms. No chest pain or dyspnea.. No recent surgery, immobilization, cancer/chemo, hospitalization, hx thrombosis. No low back, abdominal, or flank pain. No urinary or GI symptoms. Pt states that she took ibuprofen once or twice but this did not help so she did not take again.  Pt also notes that last night she woke up in the night and her left arm had numbness,tingling sensation like she has not experienced before. Pt states that she moved her arm around and it returned to normal, there was no pain, she was able to move it completely, but felt tingling, no other neurological symptoms at that time associated. . This lasted for what she thinks was about 10 minutes and resolved completely and has not returned.     Back Pain  This is a new problem. The current episode started in the past 7 days (3d). The problem is unchanged. The pain is present in the thoracic spine. The quality of the pain is described as stabbing. The pain is at a severity of 8/10. The pain is severe. The pain is the same all the time. The symptoms are aggravated by twisting and bending (breathing). Associated symptoms include numbness (L arm/hand -resolved). Pertinent negatives include no abdominal pain, " bladder incontinence, bowel incontinence, chest pain, dysuria, fever, headaches, leg pain, paresis, paresthesias, pelvic pain, perianal numbness, tingling, weakness or weight loss. She has tried NSAIDs for the symptoms. The treatment provided no relief.       Constitution: Negative for chills, sweating, fatigue and fever.   Neck: Negative for neck pain and neck stiffness.   Cardiovascular: Negative for chest pain, leg swelling, palpitations and sob on exertion.   Respiratory: Negative for cough, sputum production, shortness of breath, stridor and wheezing.    Gastrointestinal: Negative for abdominal pain and bowel incontinence.   Genitourinary: Negative for dysuria, frequency, urgency, flank pain, bladder incontinence, hematuria and pelvic pain.   Musculoskeletal: Positive for pain and back pain. Negative for trauma and joint pain.   Skin: Negative for color change and rash.   Neurological: Positive for numbness (L arm/hand -resolved) and tingling (L arm/hand -resolved). Negative for headaches.       Objective:      Physical Exam   Constitutional: She is oriented to person, place, and time. She appears well-developed. She is cooperative.  Non-toxic appearance. She does not appear ill. No distress.   HENT:   Head: Normocephalic and atraumatic.   Nose: Nose normal.   Mouth/Throat: Oropharynx is clear and moist and mucous membranes are normal.   Eyes: Conjunctivae and lids are normal.   Neck: Trachea normal and phonation normal. Neck supple.      Comments: Full ROM no pain or ttp of cervical area.   Cardiovascular: Normal rate, regular rhythm, normal heart sounds and normal pulses.   Pulmonary/Chest: Effort normal and breath sounds normal. No accessory muscle usage or stridor. No apnea, no tachypnea and no bradypnea. She has no decreased breath sounds. She has no wheezes. She has no rhonchi. She has no rales.   Ctab.     Comments: Ctab.     Abdominal: Normal appearance. She exhibits no abdominal bruit, no pulsatile  midline mass and no mass. Soft. flat abdomenThere is no abdominal tenderness. There is no left CVA tenderness and no right CVA tenderness.   Musculoskeletal:         General: No deformity.      Lumbar back: Normal.        Back:       Comments: bilat shoulders, elbows, wrists full ROM 5/5 strength.  str and finger abduction str 5/5 bilat. Radial pulses 2+ bilat. Sensation intact. No extremity swelling/edema. Temperature and color of bilat UE assessed as appropriate. Tingling not reproduced with percussing over cubital or carpal tunnel of left UE.    Neurological: no focal deficit. She is alert and oriented to person, place, and time. She has normal motor skills, normal sensation, normal strength, normal reflexes and intact cranial nerves. She displays no weakness, no atrophy, no tremor, facial symmetry and no dysarthria. No cranial nerve deficit or sensory deficit. She exhibits normal muscle tone. She displays no seizure activity. Gait and coordination normal. Gait normal. GCS eye subscore is 4. GCS verbal subscore is 5. GCS motor subscore is 6.   Skin: Skin is warm, dry, intact and not diaphoretic.   Psychiatric: Her speech is normal and behavior is normal. Judgment and thought content normal.   Nursing note and vitals reviewed.        Assessment:       1. Strain of thoracic back region    2. Paresthesia of left arm          Plan:       Consistent with muscle strain. Imaging not indicated at this time. Pt woke up in the middle of night with left arm paresthesia potentially from sleeping position, resolved and has not returned. Neuro exam normal today. Instructed follow up with spine prn for persistent pain or recurrent paresthesia with the pain. Follow up precautions given. All questions answered. discussed home care, x options. Pt expresses understanding and agrees with plan of care.   Strain of thoracic back region  -     Ambulatory referral/consult to Orthopedics  -     cyclobenzaprine (FLEXERIL) 5 MG tablet;  Take 1 tablet (5 mg total) by mouth 3 (three) times daily as needed for Muscle spasms.  Dispense: 21 tablet; Refill: 0  -     diclofenac (VOLTAREN) 50 MG EC tablet; Take 1 tablet (50 mg total) by mouth 3 (three) times daily as needed (pain).  Dispense: 21 tablet; Refill: 0    Paresthesia of left arm  Comments:  resolved      Patient Instructions   - Rest.    - Drink plenty of fluids.    - Acetaminophen (tylenol) or Ibuprofen (advil,motrin) as directed as needed for fever/pain. Avoid tylenol if you have a history of liver disease. Do not take ibuprofen if you have a history of GI bleeding, kidney disease, or if you take blood thinners.     - take diclofenac as prescribed with food to help with pain and inflammation.  Do not take over the counter NSAIDs (aleve, advil, ibuprofen) while taking this medication.  You can take Tylenol 650-1000 mg every 6-8 hours as directed along with this medication for pain relief.    -you can take flexeril (cyclobenzaprine)  as prescribed.  Initial dose is 5 mg, may increase to 10 mg if needed. This is a muscle relaxer that can help with tension of the muscles to help reduce pain. This can also help with muscle spasm. This medicine may cause drowsiness.  Do not drive for operate heavy machinery while on this medication.     - No heavy lifting.    - Icing a muscle strain is best for the first 24-48 hours after an injury. After that, low heat to areas of pain for 20 minutes at a time can help.  - Go to the ER for any weakness or sensation loss of the legs, or loss of bowel or bladder control.      - Follow up with your PCP or ortho spine specialist as directed in the next 1-2 weeks if not improved or as needed.  You can call [phone]  to schedule an appointment with the appropriate provider.    - Go to the ER or seek medical attention immediately if you develop new or worsening symptoms.     - You must understand that you have received an Urgent Care treatment only and that you may  be released before all of your medical problems are known or treated.   - You, the patient, will arrange for follow up care as instructed.   - If your condition worsens or fails to improve we recommend that you receive another evaluation at the ER immediately or contact your PCP to discuss your concerns or return here.

## 2022-07-14 ENCOUNTER — HOSPITAL ENCOUNTER (EMERGENCY)
Facility: HOSPITAL | Age: 22
Discharge: HOME | End: 2022-07-14
Attending: EMERGENCY MEDICINE
Payer: MEDICAID

## 2022-07-14 VITALS
RESPIRATION RATE: 16 BRPM | OXYGEN SATURATION: 96 % | WEIGHT: 140 LBS | DIASTOLIC BLOOD PRESSURE: 56 MMHG | HEART RATE: 66 BPM | TEMPERATURE: 99 F | SYSTOLIC BLOOD PRESSURE: 129 MMHG | BODY MASS INDEX: 20.09 KG/M2

## 2022-07-14 DIAGNOSIS — H93.A1 PULSATILE TINNITUS OF RIGHT EAR: Primary | ICD-10-CM

## 2022-07-14 PROCEDURE — 99281 EMR DPT VST MAYX REQ PHY/QHP: CPT

## 2022-07-14 PROCEDURE — 99282 EMERGENCY DEPT VISIT SF MDM: CPT | Mod: ,,, | Performed by: EMERGENCY MEDICINE

## 2022-07-14 PROCEDURE — 99282 PR EMERGENCY DEPT VISIT,LEVEL II: ICD-10-PCS | Mod: ,,, | Performed by: EMERGENCY MEDICINE

## 2022-07-14 NOTE — ED PROVIDER NOTES
Encounter Date: 7/14/2022    SCRIBE #1 NOTE: I, Radha Harper, am scribing for, and in the presence of,  Rufino Solorzano III, MD. I have scribed the entire note.       History     Chief Complaint   Patient presents with    Ear Problem     Reports whistling sound in ear x 5 days. Denies pain.     Time patient was seen by the provider: 3:50 PM      The patient is a 21 y.o. female with no significant past medical history who presents to the ED with a complaint of a whistling sound in her right ear ongoing for 5 days. She notes the sound will fluctuate in rhythm throughout the day. Patient states the whistling is not a single constant rhythm. Denies fever, cough, congestion, or ear pain.    The history is provided by the patient and medical records. No  was used.     Review of patient's allergies indicates:  No Known Allergies  Past Medical History:   Diagnosis Date    Anxiety      History reviewed. No pertinent surgical history.  Family History   Problem Relation Age of Onset    No Known Problems Mother     No Known Problems Father      Social History     Tobacco Use    Smoking status: Never Smoker    Smokeless tobacco: Never Used   Substance Use Topics    Alcohol use: Yes    Drug use: Yes     Types: Marijuana     Review of Systems   Constitutional: Negative for fever.   HENT: Negative for sore throat.         +ear problem   Respiratory: Negative for shortness of breath.    Cardiovascular: Negative for chest pain.   Gastrointestinal: Negative for nausea.   Genitourinary: Negative for dysuria.   Musculoskeletal: Negative for back pain.   Skin: Negative for rash.   Neurological: Negative for weakness.   Hematological: Does not bruise/bleed easily.       Physical Exam     Initial Vitals [07/14/22 1420]   BP Pulse Resp Temp SpO2   (!) 129/56 66 16 98.8 °F (37.1 °C) 96 %      MAP       --         Physical Exam    Nursing note and vitals reviewed.  Constitutional: Vital signs are normal. She  appears well-developed and well-nourished.   HENT:   Head: Normocephalic and atraumatic.   Right Ear: Tympanic membrane normal. Tympanic membrane is not erythematous and not bulging. No middle ear effusion.   Mouth/Throat: Oropharynx is clear and moist.   Eyes: Conjunctivae and EOM are normal. Pupils are equal, round, and reactive to light.   Neck: Trachea normal. Neck supple.   Normal range of motion.  Cardiovascular: Normal pulses.   Musculoskeletal:         General: Normal range of motion.      Cervical back: Normal range of motion and neck supple.     Neurological: She is alert and oriented to person, place, and time.   Skin: Skin is warm and dry.         ED Course   Procedures  Labs Reviewed - No data to display       Imaging Results    None          Medications - No data to display  Medical Decision Making:   History:   Old Medical Records: I decided to obtain old medical records.  Initial Assessment:   Patient with whooshing sound in her right ear for the last five days. Sound is consistent with pulsatile tinnitus. I will refer to ENT for further workup and she will try a decongestant for the next few days.  Differential Diagnosis:   Otitis media, pulsatile tinnitus, eustachian dysfunction   Other:   I have discussed this case with another health care provider.       <> Summary of the Discussion: ENT          Scribe Attestation:   Scribe #1: I performed the above scribed service and the documentation accurately describes the services I performed. I attest to the accuracy of the note.                 Clinical Impression:   Final diagnoses:  [H93.A1] Pulsatile tinnitus of right ear (Primary)          ED Disposition Condition    Discharge Stable        ED Prescriptions     None        Follow-up Information     Follow up With Specialties Details Why Contact Info Additional Information    [facility] Schedule an appointment as soon as possible for a visit in 3 days  [id number]  [facility] [address] [phone] [facility] - Main Building, 4th Floor Please park in [facility] and use Clinic elevator        I, Dr. Rufino Solorzano III, personally performed the services described in this documentation. All medical record entries made by the scribe were at my direction and in my presence.  I have reviewed the chart and agree that the record reflects my personal performance and is accurate and complete. Rufino Solorzano III, MD.  9:41 AM 07/15/2022       Rufino Solorzano III, MD  07/15/22 0941

## 2022-07-14 NOTE — ED NOTES
Patient identifiers verified and correct for Ms Finn  C/C: Right ear issue SEE NN  APPEARANCE: awake and alert in NAD.  SKIN: warm, dry and intact. No breakdown or bruising.  MUSCULOSKELETAL: Patient moving all extremities spontaneously, no obvious swelling or deformities noted. Ambulates independently.  RESPIRATORY: Denies shortness of breath.Respirations unlabored.   CARDIAC: Denies CP, 2+ distal pulses; no peripheral edema  ABDOMEN: S/ND/NT, Denies nausea  : voids spontaneously, denies difficulty  Neurologic: AAO x 4; follows commands equal strength in all extremities; denies numbness/tingling. Denies dizziness  Denies weakness

## 2022-07-18 ENCOUNTER — TELEPHONE (OUTPATIENT)
Dept: OTOLARYNGOLOGY | Facility: CLINIC | Age: 22
End: 2022-07-18
Payer: MEDICAID

## 2022-07-18 NOTE — TELEPHONE ENCOUNTER
----- Message from Suzy Choi sent at 7/18/2022 10:41 AM CDT -----  Regarding: Hearing test  Contact: [phone]  Calling to speak with nurse to determine if hearing test will be needed prior to appointment. Please call and advise

## 2022-07-22 ENCOUNTER — OFFICE VISIT (OUTPATIENT)
Dept: OTOLARYNGOLOGY | Facility: CLINIC | Age: 22
End: 2022-07-22
Payer: MEDICAID

## 2022-07-22 VITALS
HEART RATE: 68 BPM | BODY MASS INDEX: 24.38 KG/M2 | WEIGHT: 170.31 LBS | SYSTOLIC BLOOD PRESSURE: 115 MMHG | HEIGHT: 70 IN | DIASTOLIC BLOOD PRESSURE: 78 MMHG

## 2022-07-22 DIAGNOSIS — H93.A1 SUBJECTIVE PULSATILE TINNITUS OF RIGHT EAR: Primary | ICD-10-CM

## 2022-07-22 PROCEDURE — 99999 PR PBB SHADOW E&M-EST. PATIENT-LVL III: CPT | Mod: PBBFAC,,, | Performed by: OTOLARYNGOLOGY

## 2022-07-22 PROCEDURE — 99213 OFFICE O/P EST LOW 20 MIN: CPT | Mod: PBBFAC,PN | Performed by: OTOLARYNGOLOGY

## 2022-07-22 PROCEDURE — 99999 PR PBB SHADOW E&M-EST. PATIENT-LVL III: ICD-10-PCS | Mod: PBBFAC,,, | Performed by: OTOLARYNGOLOGY

## 2022-07-22 PROCEDURE — 3074F SYST BP LT 130 MM HG: CPT | Mod: CPTII,,, | Performed by: OTOLARYNGOLOGY

## 2022-07-22 PROCEDURE — 3008F BODY MASS INDEX DOCD: CPT | Mod: CPTII,,, | Performed by: OTOLARYNGOLOGY

## 2022-07-22 PROCEDURE — 1159F PR MEDICATION LIST DOCUMENTED IN MEDICAL RECORD: ICD-10-PCS | Mod: CPTII,,, | Performed by: OTOLARYNGOLOGY

## 2022-07-22 PROCEDURE — 3008F PR BODY MASS INDEX (BMI) DOCUMENTED: ICD-10-PCS | Mod: CPTII,,, | Performed by: OTOLARYNGOLOGY

## 2022-07-22 PROCEDURE — 3078F DIAST BP <80 MM HG: CPT | Mod: CPTII,,, | Performed by: OTOLARYNGOLOGY

## 2022-07-22 PROCEDURE — 3074F PR MOST RECENT SYSTOLIC BLOOD PRESSURE < 130 MM HG: ICD-10-PCS | Mod: CPTII,,, | Performed by: OTOLARYNGOLOGY

## 2022-07-22 PROCEDURE — 3078F PR MOST RECENT DIASTOLIC BLOOD PRESSURE < 80 MM HG: ICD-10-PCS | Mod: CPTII,,, | Performed by: OTOLARYNGOLOGY

## 2022-07-22 PROCEDURE — 99204 PR OFFICE/OUTPT VISIT, NEW, LEVL IV, 45-59 MIN: ICD-10-PCS | Mod: S$PBB,,, | Performed by: OTOLARYNGOLOGY

## 2022-07-22 PROCEDURE — 99204 OFFICE O/P NEW MOD 45 MIN: CPT | Mod: S$PBB,,, | Performed by: OTOLARYNGOLOGY

## 2022-07-22 PROCEDURE — 1159F MED LIST DOCD IN RCRD: CPT | Mod: CPTII,,, | Performed by: OTOLARYNGOLOGY

## 2022-07-22 RX ORDER — FLUTICASONE PROPIONATE 50 MCG
SPRAY, SUSPENSION (ML) NASAL
COMMUNITY
Start: 2022-06-08

## 2022-07-22 RX ORDER — CETIRIZINE HYDROCHLORIDE 10 MG/1
10 TABLET ORAL DAILY
COMMUNITY
Start: 2022-06-08

## 2022-07-22 RX ORDER — ESCITALOPRAM OXALATE 10 MG/1
10 TABLET ORAL EVERY MORNING
COMMUNITY
Start: 2022-06-08

## 2022-07-22 RX ORDER — ALBUTEROL SULFATE 90 UG/1
AEROSOL, METERED RESPIRATORY (INHALATION)
COMMUNITY
Start: 2022-06-08

## 2022-07-22 NOTE — PATIENT INSTRUCTIONS
We will proceed with comprehensive audiologic testing as discussed.  We will then follow up on this with likely CTA /V of the head neck to rule out any vascular lesions and also look for any congenital malformations which may cause pulse synchronous tinnitus.  If however there is significant inner ear functional asymmetry MRI with contrast and MR A/V will be ordered.

## 2022-07-22 NOTE — PROGRESS NOTES
"[facility]    Subjective:      Patient: Vaishnavi Finn Patient PCP: Primary Doctor No         :  2000     Sex:  female      MRN:  [id number]          Date of Visit: 2022      Chief Complaint: Tinnitus (States for 2 weeks feels like hearing "wind" or "heartbeat" in her right ear. )      Patient ID: Vaishnavi Finn is a 21 y.o. female lifelong NON-smoker with a history of anxiety on Lexapro and subjective allergy no prior ear disease history seen in ER and referred for new onset right ear pulsation.  Prior evaluation in 3/22 in ED for postural dizziness/presyncope  Which was attributed to an anxiety attack.  She has had anxiety and treated with Lexapro for over 2 years with fluctuating symptoms and she has had anxiety attacks / panic in the past which were not consistent with this attack in March which was feeling dizzy and tingling all over not his lips and fingers followed by palpitations.  More recently the pulsatile tinnitus of the right ear is pulse synchronous as a whooshing sound and not associated with any hearing loss aural fullness or other tinnitus.  She has had no head trauma.  She has no ocular symptoms and no focal neurologic symptoms.  She has no chest pain or lightheadedness or positional dizziness or dizziness of any kind since her singular "anxiety attack".  No audiogram.  No imaging.    Labs from 2022 reviewed including CMP CBC which showed only a very mild degree of anemia hemoglobin 11.8.  No TSH testing.  No cardiac workup no vascular imaging.    Review of Systems   Constitutional: Negative.    HENT: Positive for tinnitus.    Eyes: Negative.    Respiratory: Negative.    Cardiovascular: Negative.    Gastrointestinal: Negative.    Endocrine: Negative.    Genitourinary: Negative.    Musculoskeletal: Negative.    Allergic/Immunologic: Negative.    Neurological: Negative.    Hematological: Negative.    Psychiatric/Behavioral: Negative.         Past Medical History  She has a past " medical history of Anxiety.    Family / Surgical / Social History  Her family history includes No Known Problems in her father and mother.    No past surgical history on file.    Social History     Tobacco Use    Smoking status: Never Smoker    Smokeless tobacco: Never Used   Substance and Sexual Activity    Alcohol use: Yes    Drug use: Yes     Types: Marijuana    Sexual activity: Not on file       Medications  She has a current medication list which includes the following prescription(s): albuterol, cetirizine, escitalopram oxalate, and fluticasone propionate.      Allergies  Review of patient's allergies indicates:  No Known Allergies    All medications, allergies, and past history have been reviewed.    Objective:      Vitals:  Vitals - 1 value per visit 7/14/2022 7/22/2022 7/22/2022   SYSTOLIC 129 - -   DIASTOLIC 56 - -   Pulse 66 - -   Temp 98.8 - -   Resp 16 - -   SPO2 96 - -   Weight (lb) 140 - 170.31   Weight (kg) 63.504 - 77.25   Height - - 70   BMI (Calculated) - - 24.4   VISIT REPORT - - -   Pain Score  - 0 -       Body surface area is 1.95 meters squared.    Physical Exam:    GENERAL  APPEARANCE -  alert, appears stated age and cooperative  BARRIER(S) TO COMMUNICATION -  none VOICE - appropriate for age and gender    INTEGUMENTARY  no suspicious head and neck lesions    HEENT  HEAD: Normocephalic, without obvious abnormality, atraumatic  FACE: INSPECTION - Symmetric, no signs of trauma, no suspicious lesion(s)  PALPATION -  No masses SALIVARY GLANDS - non-tender with no appreciable mass  STRENGTH - facial symmetry  NECK/THYROID: normal atraumatic, no neck masses, normal thyroid, no jvd. No bruits.  PST decreases with right IJ manual occlusion and increases with left.    EYES  Normal occular alignment and mobility with no visible nystagmus at rest    EARS/NOSE/MOUTH/THROAT  EARS  PINNAE AND EXTERNAL EARS - no suspicious lesion OTOSCOPIC EXAM (surgical microscopy was used for  visualization/instrumentation): EAR EXAM - Normal ear canals, tympanic membranes and mobility, and middle ear spaces bilaterally.  HEARING - Rosario test midline and grossly intact to voice/finger rub    NOSE AND SINUSES  EXTERNAL NOSE - Grossly normal for age/sex  SEPTUM - mild deviation left TURBINATES - mild right > left enlargement  MUCOSA - mild cyanosis     MOUTH AND THROAT   ORAL CAVITY, LIPS, TEETH, GUMS & TONGUE - moist, no suspicious lesions  OROPHARYNX /TONSILS/PHARYNGEAL WALLS/HYPOPHARYNX - no erythema or exudates  NASOPHARYNX - limited mirror exam - unable to visualize due to anatomy/gag  LARYNX -  - limited mirror exam - unable to visualize due to anatomy/gag      CHEST AND LUNG   INSPECTION & AUSCULTATION - normal effort, no stridor    CARDIOVASCULAR  AUSCULTATION & PERIPHERAL VASCULAR - regular rate and rhythm., normal apical impulse, regular rate and rhythm, no click, no rub and no murmur appreciated    NEUROLOGIC  MENTAL STATUS - alert, interactive CRANIAL NERVES - normal    LYMPHATIC  HEAD AND NECK - non-palpable; SUPRACLAVICULAR - deferred; AXILLARY - deferred; INGUINAL - deferred; LIVER/SPLEEN - deferred        Procedure(s):  None    Labs:        Assessment:      Problem List Items Addressed This Visit    None              Plan:         We will proceed with comprehensive audiologic testing as discussed.  We will then follow up on this with likely CTA /V of the head neck to rule out any vascular lesions and also look for any congenital malformations which may cause pulse synchronous tinnitus.  If however there is significant inner ear functional asymmetry MRI with contrast and MR A/V will be ordered.

## 2022-07-26 ENCOUNTER — CLINICAL SUPPORT (OUTPATIENT)
Dept: AUDIOLOGY | Facility: CLINIC | Age: 22
End: 2022-07-26
Payer: MEDICAID

## 2022-07-26 DIAGNOSIS — H93.A1 SUBJECTIVE PULSATILE TINNITUS OF RIGHT EAR: ICD-10-CM

## 2022-07-26 PROCEDURE — 92556 SPEECH AUDIOMETRY COMPLETE: CPT | Mod: PBBFAC | Performed by: AUDIOLOGIST

## 2022-07-26 PROCEDURE — 92553 AUDIOMETRY AIR & BONE: CPT | Mod: PBBFAC | Performed by: AUDIOLOGIST

## 2022-07-26 PROCEDURE — 92550 TYMPANOMETRY & REFLEX THRESH: CPT | Mod: PBBFAC | Performed by: AUDIOLOGIST

## 2022-07-26 NOTE — PROGRESS NOTES
Ms. Vaishnavi Finn was seen in the clinic today for an audiological evaluation.  Vaishnavi Finn reported tinnitus of the right ear.    Audiological testing revealed normal hearing sensitivity for the right ear and normal hearing sensitivity for the left ear.  A speech reception threshold was obtained at 20 dBHL for the right ear and at 10 dBHL for the left ear.  Speech discrimination was 100% for the right ear and 100% for the left ear.      Tympanometry testing revealed a Type A tympanogram for the right ear and a Type A tympanogram for the left ear.  Ipsilateral acoustic reflexes were present at 1000 Hz and 2000 Hz for the right ear and were present at 1000 Hz and 2000 Hz for the left ear.    Recommendations:  1. Follow-up with Dr. Stauffer to review the results of today's evaluation  2. Audiological evaluation, as needed  3. Hearing protection when in noise

## 2022-07-26 NOTE — Clinical Note
Hi Dr. Stauffer, Please see the results of today's audio.  Please let me know if I can provide any additional information.  Thanks! Sandhya

## 2022-07-27 ENCOUNTER — PATIENT MESSAGE (OUTPATIENT)
Dept: OTOLARYNGOLOGY | Facility: CLINIC | Age: 22
End: 2022-07-27
Payer: MEDICAID

## 2022-07-27 DIAGNOSIS — H93.A1 SUBJECTIVE PULSATILE TINNITUS OF RIGHT EAR: Primary | ICD-10-CM

## 2022-08-01 ENCOUNTER — TELEPHONE (OUTPATIENT)
Dept: OTOLARYNGOLOGY | Facility: CLINIC | Age: 22
End: 2022-08-01
Payer: MEDICAID

## 2022-08-01 NOTE — TELEPHONE ENCOUNTER
Called pt. Advised that CT Scan needs to be canceled due to insurance not in network. Advised pt to contact her insurance to see if there are any approved providers in our area as we can send the orders there. Advised pt to also check to make sure that she can follow up with Dr. Stauffer, pt may have to see another in-network provider for the follow up as well. Advised pt to let us know either way, if we can or if we cannot see her, and where we can send the CT Scan order so that she can get it done. Thanks, Liyah

## 2022-08-01 NOTE — TELEPHONE ENCOUNTER
Out of state Medicaid - [facility] not in network.  Received: 3 days ago  MANOHAR Mandujano MD; ORLANDO Tenorio  Good Evening,      I called and spoke to patient  [place]. [facility] is not in need work with the medicaid plan. The only way the patient would be covered if it is a procedure doing in/thru the ER and that would require approval.     This would be considered self pay if the patient proceeds as scheduled.     Most Respectfully,   Sonja JAMES   Preservice

## 2023-06-13 ENCOUNTER — PATIENT MESSAGE (OUTPATIENT)
Dept: RESEARCH | Facility: HOSPITAL | Age: 23
End: 2023-06-13
Payer: MEDICAID

## 2023-07-06 ENCOUNTER — OFFICE VISIT (OUTPATIENT)
Dept: URGENT CARE | Facility: CLINIC | Age: 23
End: 2023-07-06

## 2023-07-06 VITALS
WEIGHT: 170 LBS | HEIGHT: 71 IN | TEMPERATURE: 98 F | HEART RATE: 60 BPM | DIASTOLIC BLOOD PRESSURE: 75 MMHG | BODY MASS INDEX: 23.8 KG/M2 | OXYGEN SATURATION: 100 % | SYSTOLIC BLOOD PRESSURE: 128 MMHG | RESPIRATION RATE: 18 BRPM

## 2023-07-06 DIAGNOSIS — R30.0 DYSURIA: Primary | ICD-10-CM

## 2023-07-06 DIAGNOSIS — N76.0 VAGINOSIS: ICD-10-CM

## 2023-07-06 LAB
B-HCG UR QL: NEGATIVE
BILIRUB UR QL STRIP: NEGATIVE
CTP QC/QA: YES
GLUCOSE UR QL STRIP: NEGATIVE
KETONES UR QL STRIP: POSITIVE
LEUKOCYTE ESTERASE UR QL STRIP: NEGATIVE
PH, POC UA: 7 (ref 5–8)
POC BLOOD, URINE: NEGATIVE
POC NITRATES, URINE: NEGATIVE
PROT UR QL STRIP: NEGATIVE
SP GR UR STRIP: 1.01 (ref 1–1.03)
UROBILINOGEN UR STRIP-ACNC: 1 (ref 0.1–1.1)

## 2023-07-06 PROCEDURE — 99203 OFFICE O/P NEW LOW 30 MIN: CPT | Mod: TIER,S$GLB,, | Performed by: FAMILY MEDICINE

## 2023-07-06 PROCEDURE — 81025 POCT URINE PREGNANCY: ICD-10-PCS | Mod: S$GLB,,, | Performed by: FAMILY MEDICINE

## 2023-07-06 PROCEDURE — 99203 PR OFFICE/OUTPT VISIT, NEW, LEVL III, 30-44 MIN: ICD-10-PCS | Mod: TIER,S$GLB,, | Performed by: FAMILY MEDICINE

## 2023-07-06 PROCEDURE — 81003 POCT URINALYSIS, DIPSTICK, AUTOMATED, W/O SCOPE: ICD-10-PCS | Mod: QW,S$GLB,, | Performed by: FAMILY MEDICINE

## 2023-07-06 PROCEDURE — 81003 URINALYSIS AUTO W/O SCOPE: CPT | Mod: QW,S$GLB,, | Performed by: FAMILY MEDICINE

## 2023-07-06 PROCEDURE — 81025 URINE PREGNANCY TEST: CPT | Mod: S$GLB,,, | Performed by: FAMILY MEDICINE

## 2023-07-06 RX ORDER — FLUCONAZOLE 150 MG/1
150 TABLET ORAL DAILY
Qty: 1 TABLET | Refills: 0 | Status: SHIPPED | OUTPATIENT
Start: 2023-07-06 | End: 2023-07-07

## 2023-07-06 RX ORDER — METRONIDAZOLE 500 MG/1
500 TABLET ORAL EVERY 12 HOURS
Qty: 14 TABLET | Refills: 0 | Status: SHIPPED | OUTPATIENT
Start: 2023-07-06 | End: 2023-07-13

## 2023-07-06 NOTE — PROGRESS NOTES
"Subjective:      Patient ID: Vaishnavi Finn is a 22 y.o. female.    Vitals:  height is 5' 11" (1.803 m) and weight is 77.1 kg (170 lb). Her oral temperature is 98.2 °F (36.8 °C). Her blood pressure is 128/75 and her pulse is 60. Her respiration is 18 and oxygen saturation is 100%.     Chief Complaint: Urinary Tract Infection    22 y.o female c/o vaginal pain started 2 days ago. Patient states that she notice a smell and discomfort that comes and goes. Patient states that she notice mild discharge. Denies any urgency or frequency.Patient reports that she is sexually active inconstancy with condoms.    Urinary Tract Infection   This is a new problem. Episode onset: 2 days ago. The problem has been gradually worsening. The quality of the pain is described as burning. The pain is at a severity of 3/10. The pain is mild. There has been no fever. She is Sexually active. There is No history of pyelonephritis. Associated symptoms include a discharge. Pertinent negatives include no frequency, hematuria, hesitancy, nausea, possible pregnancy, urgency, vomiting or weight loss. She has tried nothing for the symptoms. The treatment provided no relief. There is no history of catheterization, diabetes insipidus, diabetes mellitus, hypertension, kidney stones, recurrent UTIs, STD or urinary stasis.     Gastrointestinal:  Negative for nausea and vomiting.   Genitourinary:  Negative for frequency, urgency and hematuria.    Objective:     Physical Exam  Constitutional: Pt oriented to person, place, and time.  Non-toxic appearance.   Patient does not appear ill. No distress. normal  HENT: No icterus or facial swelling appreciated  Head: Normocephalic and atraumatic.   Nose: No congestion.   Pulmonary/Chest: Effort normal. No stridor. No respiratory distress.   Abdominal: Normal appearance. Abdomen exhibits no distension.   GYN: no external genital lesions appreciated  There is thin grayish d/c with small clumps of thick white " discharge. Os closed.    Musculoskeletal:         General: No swelling.   Neurological: no focal deficit. Patient is alert and oriented to person, place, and time.   Skin: Skin is not diaphoretic and not pale. no jaundice  Psychiatric: Patients behavior is normal. Mood, judgment and thought content normal.     Assessment:     1. Dysuria    2. Vaginosis        Plan:       Dysuria  -     POCT Urinalysis, Dipstick, Automated, W/O Scope-- neg except ketones, pt states has not eaten today  -     POCT urine pregnancy- negative    Vaginosis    Pt declined vaginosis panel / gc chlamydia due to FFS cost and will instead complete full STI panel at a 'free clinic'    -     metroNIDAZOLE (FLAGYL) 500 MG tablet; Take 1 tablet (500 mg total) by mouth every 12 (twelve) hours. for 7 days  Dispense: 14 tablet; Refill: 0  -     fluconazole (DIFLUCAN) 150 MG Tab; Take 1 tablet (150 mg total) by mouth once daily. for 1 day  Dispense: 1 tablet; Refill: 0

## 2023-07-06 NOTE — PATIENT INSTRUCTIONS
Sacha treat your vaginosis symptoms for possible BV and yeast infection.     I do still recommend that you obtain STD testing at a free clinic (gonorrhea, chlamydia, trichomonas, HIV and syphilis screening)